# Patient Record
Sex: FEMALE | Race: WHITE | NOT HISPANIC OR LATINO | Employment: OTHER | URBAN - NONMETROPOLITAN AREA
[De-identification: names, ages, dates, MRNs, and addresses within clinical notes are randomized per-mention and may not be internally consistent; named-entity substitution may affect disease eponyms.]

---

## 2017-01-05 ENCOUNTER — OFFICE VISIT (OUTPATIENT)
Dept: MEDICAL GROUP | Facility: PHYSICIAN GROUP | Age: 77
End: 2017-01-05
Payer: MEDICARE

## 2017-01-05 VITALS
HEIGHT: 66 IN | TEMPERATURE: 97.9 F | HEART RATE: 93 BPM | WEIGHT: 135 LBS | RESPIRATION RATE: 16 BRPM | BODY MASS INDEX: 21.69 KG/M2 | SYSTOLIC BLOOD PRESSURE: 130 MMHG | DIASTOLIC BLOOD PRESSURE: 70 MMHG | OXYGEN SATURATION: 96 %

## 2017-01-05 DIAGNOSIS — M16.0 PRIMARY OSTEOARTHRITIS OF BOTH HIPS: ICD-10-CM

## 2017-01-05 DIAGNOSIS — M48.061 SPINAL STENOSIS OF LUMBAR REGION: ICD-10-CM

## 2017-01-05 PROCEDURE — 99212 OFFICE O/P EST SF 10 MIN: CPT | Performed by: INTERNAL MEDICINE

## 2017-01-05 RX ORDER — DICLOFENAC SODIUM 75 MG/1
75 TABLET, DELAYED RELEASE ORAL 2 TIMES DAILY
Qty: 60 TAB | Refills: 5 | Status: SHIPPED | OUTPATIENT
Start: 2017-01-05

## 2017-01-05 RX ORDER — CYCLOBENZAPRINE HCL 10 MG
TABLET ORAL
Qty: 30 TAB | Refills: 5 | Status: SHIPPED | OUTPATIENT
Start: 2017-01-05

## 2017-01-05 NOTE — ASSESSMENT & PLAN NOTE
Patient did see ortho, evaluation with xrays, she states her hip pain is much better, she has not had any surgery.  She did have the class for hip replacement but got spooked on the surgery, she seems much better after medrol dose rohan and now with the diclofenac.  She does want to be more flexible on the right hip, has had physical therapy, she is trying home therapy and stretching.

## 2017-01-05 NOTE — ASSESSMENT & PLAN NOTE
She continues to complain of minimal back pain, feels the diclofenac is very helpful. No radicular pain, no LE weakness.

## 2017-01-05 NOTE — MR AVS SNAPSHOT
"        Africa Warnershears   2017 10:20 AM   Office Visit   MRN: [id number]    Department:  [facility]   Dept Phone:  [phone]    Description:  Female : 1940   Provider:  Marli BAKER M.D.           Reason for Visit     Follow-Up hip pain       Allergies as of 2017     No Known Allergies      You were diagnosed with     Primary osteoarthritis of both hips   [073989]       Spinal stenosis of lumbar region   [677179]         Vital Signs     Blood Pressure Pulse Temperature Respirations Height Weight    130/70 mmHg 93 36.6 °C (97.9 °F) 16 1.689 m (5' 6.5\") 61.236 kg (135 lb)    Body Mass Index Oxygen Saturation Smoking Status             21.47 kg/m2 96% Never Smoker          Basic Information     Date Of Birth Sex Race Ethnicity Preferred Language    1940 Female White Non- English      Your appointments     2017  1:20 PM   ANNUAL EXAM PREVENTATIVE with Marli BAKER M.D.   [facility] (--)    [address] [facility] [id number] [phone]              Problem List              ICD-10-CM Priority Class Noted - Resolved    OSTEOPOROSIS    2012 - Present    Tinnitus    2012 - Present    History of mammogram Z92.89   2012 - Present    S/P colonoscopy Z98.890   2012 - Present    Sciatica M54.30   2012 - Present    Right knee pain M25.561   2014 - Present    LLQ abdominal pain R10.32   2016 - Present    Spinal stenosis of lumbar region M48.06   2016 - Present    Primary osteoarthritis of both hips M16.0   2016 - Present    Neuropathy (HCC) G62.9   2016 - Present      Health Maintenance        Date Due Completion Dates    IMM DTaP/Tdap/Td Vaccine (1 - Tdap) 1960 ---    PAP SMEAR 1962 ---    IMM PNEUMOCOCCAL 65+ (ADULT) LOW/MEDIUM RISK SERIES (1 of 2 - PCV13) 2006 ---    MAMMOGRAM 2013    BONE DENSITY 2016 (Done)    Override on 2011: Done  "    IMM INFLUENZA (1) 9/1/2016 11/2/2012    COLONOSCOPY 4/16/2021 4/16/2011 (Done)    Override on 4/16/2011: Done ([place])            Current Immunizations     Influenza TIV (IM) 11/2/2012  1:44 PM    SHINGLES VACCINE 9/2/2014      Below and/or attached are the medications your provider expects you to take. Review all of your home medications and newly ordered medications with your provider and/or pharmacist. Follow medication instructions as directed by your provider and/or pharmacist. Please keep your medication list with you and share with your provider. Update the information when medications are discontinued, doses are changed, or new medications (including over-the-counter products) are added; and carry medication information at all times in the event of emergency situations     Allergies:  No Known Allergies          Medications  Valid as of: January 05, 2017 - 11:39 AM    Generic Name Brand Name Tablet Size Instructions for use    Cyclobenzaprine HCl (Tab) FLEXERIL 10 MG 1 PO q HS prn back pain        Diclofenac Sodium (Tablet Delayed Response) VOLTAREN 75 MG Take 1 Tab by mouth 2 times a day.        Gabapentin (Cap) NEURONTIN 300 MG Take 300 mg by mouth 3 times a day.        MethylPREDNISolone (Tablet Therapy Pack) MEDROL DOSEPAK 4 MG As per Dose Carlos        Naproxen Sodium   Take  by mouth.        .                 Medicines prescribed today were sent to:     [facility] [address] [place] [id number]    Phone: [phone] Fax: [phone]    Open 24 Hours?: No      Medication refill instructions:       If your prescription bottle indicates you have medication refills left, it is not necessary to call your provider’s office. Please contact your pharmacy and they will refill your medication.    If your prescription bottle indicates you do not have any refills left, you may request refills at any time through one of the following ways: The  online [employer] system (except Urgent Care), by calling your provider’s office, or by asking your pharmacy to contact your provider’s office with a refill request. Medication refills are processed only during regular business hours and may not be available until the next business day. Your provider may request additional information or to have a follow-up visit with you prior to refilling your medication.   *Please Note: Medication refills are assigned a new Rx number when refilled electronically. Your pharmacy may indicate that no refills were authorized even though a new prescription for the same medication is available at the pharmacy. Please request the medicine by name with the pharmacy before contacting your provider for a refill.           [employer] Access Code: Activation code not generated  Current [employer] Status: Active

## 2017-01-10 NOTE — PROGRESS NOTES
Chief Complaint   Patient presents with   • Follow-Up     hip pain        HISTORY OF PRESENT ILLNESS: Patient is a 76 y.o. female established patient who presents today to discuss the medical issues below.    Primary osteoarthritis of both hips  Patient did see ortho, evaluation with xrays, she states her hip pain is much better, she has not had any surgery.  She did have the class for hip replacement but got spooked on the surgery, she feels much better after medrol dose rohan and now with the diclofenac.  She does want to be more flexible on the right hip, has had physical therapy, she is trying home therapy and stretching.        Spinal stenosis of lumbar region  She continues to complain of minimal back pain, feels the diclofenac is very helpful. No radicular pain, no LE weakness.        Patient Active Problem List    Diagnosis Date Noted   • Spinal stenosis of lumbar region 07/29/2016   • Primary osteoarthritis of both hips 07/29/2016   • Neuropathy (HCC) 07/29/2016   • LLQ abdominal pain 06/21/2016   • Right knee pain 04/16/2014   • OSTEOPOROSIS 11/02/2012   • Tinnitus 11/02/2012   • History of mammogram 11/02/2012   • S/P colonoscopy 11/02/2012   • Sciatica 11/02/2012       Allergies:Review of patient's allergies indicates no known allergies.    Current Outpatient Prescriptions   Medication Sig Dispense Refill   • cyclobenzaprine (FLEXERIL) 10 MG Tab 1 PO q HS prn back pain 30 Tab 5   • diclofenac EC (VOLTAREN) 75 MG Tablet Delayed Response Take 1 Tab by mouth 2 times a day. 60 Tab 5   • Naproxen Sodium (ALEVE PO) Take  by mouth.     • gabapentin (NEURONTIN) 300 MG Cap Take 300 mg by mouth 3 times a day.       No current facility-administered medications for this visit.         Past Medical History   Diagnosis Date   • OSTEOPOROSIS 11/2/2012   • Tinnitus 11/2/2012   • S/P colonoscopy 11/2/2012   • Spinal stenosis of lumbar region 7/29/2016   • Primary osteoarthritis of both hips 7/29/2016   • Neuropathy (HCC)  "2016       Social History   Substance Use Topics   • Smoking status: Never Smoker    • Smokeless tobacco: Never Used   • Alcohol Use: 0.6 oz/week     1 Glasses of wine per week      Comment: rare       No family status information on file.     Family History   Problem Relation Age of Onset   • Stroke Father       age 89   • Stroke Mother       age 87   • Cancer Sister      breast       ROS:    Respiratory: Negative for cough, sputum production, shortness of breath or wheezing.    Cardiovascular: Negative for chest pain, palpitations, orthopnea, dyspnea with exertion or edema.   Gastrointestinal: Negative for GI upset, nausea, vomiting, abdominal pain, constipation or diarrhea.   Genitourinary: Negative for dysuria, urgency, hesitancy or frequency.       Exam:    Blood pressure 130/70, pulse 93, temperature 36.6 °C (97.9 °F), resp. rate 16, height 1.689 m (5' 6.5\"), weight 61.236 kg (135 lb), SpO2 96 %.  General:  Well nourished, well developed female in NAD.  Pulmonary: Clear to ausculation and percussion.  Normal effort. No rales, rhonchi, or wheezing.  Cardiovascular: Regular rate and rhythm without murmur.   Abdomen: Normal bowel sounds soft and nontender no palpable liver spleen bladder mass.  Extremities: No LE edema noted. Neg straight leg testing, neg external hip rotation pain and no point tenderness to palpation  Neuro: Grossly nonfocal.  Psych: Alert and oriented to person, place, and time. Appropriate mood and conversation.    This dictation was created using voice recognition software. I have made reasonable attempts to correct errors, however, errors of grammar and content may exist.          Assessment/Plan:    1. Primary osteoarthritis of both hips  Reviewed risk Voltaren, conservative management for now.   - diclofenac EC (VOLTAREN) 75 MG Tablet Delayed Response; Take 1 Tab by mouth 2 times a day.  Dispense: 60 Tab; Refill: 5    2. Spinal stenosis of lumbar region  Conservative " management, doing relatively well for age, support, discussed stretching exercise, support conservatively   - cyclobenzaprine (FLEXERIL) 10 MG Tab; 1 PO q HS prn back pain  Dispense: 30 Tab; Refill: 5

## 2017-04-06 ENCOUNTER — OFFICE VISIT (OUTPATIENT)
Dept: MEDICAL GROUP | Facility: PHYSICIAN GROUP | Age: 77
End: 2017-04-06
Payer: MEDICARE

## 2017-04-06 ENCOUNTER — HOSPITAL ENCOUNTER (OUTPATIENT)
Dept: LAB | Facility: MEDICAL CENTER | Age: 77
End: 2017-04-06
Attending: FAMILY MEDICINE
Payer: MEDICARE

## 2017-04-06 VITALS
HEART RATE: 74 BPM | OXYGEN SATURATION: 92 % | BODY MASS INDEX: 21.69 KG/M2 | DIASTOLIC BLOOD PRESSURE: 70 MMHG | WEIGHT: 135 LBS | TEMPERATURE: 97.5 F | HEIGHT: 66 IN | SYSTOLIC BLOOD PRESSURE: 130 MMHG

## 2017-04-06 DIAGNOSIS — R20.2 PARESTHESIA OF BOTH FEET: ICD-10-CM

## 2017-04-06 DIAGNOSIS — M81.0 OSTEOPOROSIS: ICD-10-CM

## 2017-04-06 LAB
ALBUMIN SERPL BCP-MCNC: 4.4 G/DL (ref 3.2–4.9)
ALBUMIN/GLOB SERPL: 1.4 G/DL
ALP SERPL-CCNC: 65 U/L (ref 30–99)
ALT SERPL-CCNC: 18 U/L (ref 2–50)
ANION GAP SERPL CALC-SCNC: 5 MMOL/L (ref 0–11.9)
AST SERPL-CCNC: 26 U/L (ref 12–45)
BILIRUB SERPL-MCNC: 0.4 MG/DL (ref 0.1–1.5)
BUN SERPL-MCNC: 19 MG/DL (ref 8–22)
CALCIUM SERPL-MCNC: 10.1 MG/DL (ref 8.5–10.5)
CHLORIDE SERPL-SCNC: 103 MMOL/L (ref 96–112)
CO2 SERPL-SCNC: 31 MMOL/L (ref 20–33)
CREAT SERPL-MCNC: 0.8 MG/DL (ref 0.5–1.4)
ERYTHROCYTE [DISTWIDTH] IN BLOOD BY AUTOMATED COUNT: 42.3 FL (ref 35.9–50)
ERYTHROCYTE [SEDIMENTATION RATE] IN BLOOD BY WESTERGREN METHOD: 12 MM/HOUR (ref 0–30)
GFR SERPL CREATININE-BSD FRML MDRD: >60 ML/MIN/1.73 M 2
GLOBULIN SER CALC-MCNC: 3.2 G/DL (ref 1.9–3.5)
GLUCOSE SERPL-MCNC: 82 MG/DL (ref 65–99)
HCT VFR BLD AUTO: 45.2 % (ref 37–47)
HGB BLD-MCNC: 14.7 G/DL (ref 12–16)
MCH RBC QN AUTO: 29.2 PG (ref 27–33)
MCHC RBC AUTO-ENTMCNC: 32.5 G/DL (ref 33.6–35)
MCV RBC AUTO: 89.7 FL (ref 81.4–97.8)
PLATELET # BLD AUTO: 261 K/UL (ref 164–446)
PMV BLD AUTO: 11.6 FL (ref 9–12.9)
POTASSIUM SERPL-SCNC: 4.4 MMOL/L (ref 3.6–5.5)
PROT SERPL-MCNC: 7.6 G/DL (ref 6–8.2)
RBC # BLD AUTO: 5.04 M/UL (ref 4.2–5.4)
SODIUM SERPL-SCNC: 139 MMOL/L (ref 135–145)
TSH SERPL DL<=0.005 MIU/L-ACNC: 1.96 UIU/ML (ref 0.3–3.7)
VIT B12 SERPL-MCNC: 731 PG/ML (ref 211–911)
WBC # BLD AUTO: 6.9 K/UL (ref 4.8–10.8)

## 2017-04-06 PROCEDURE — 36415 COLL VENOUS BLD VENIPUNCTURE: CPT

## 2017-04-06 PROCEDURE — 4040F PNEUMOC VAC/ADMIN/RCVD: CPT | Mod: 8P | Performed by: FAMILY MEDICINE

## 2017-04-06 PROCEDURE — 85652 RBC SED RATE AUTOMATED: CPT

## 2017-04-06 PROCEDURE — G8420 CALC BMI NORM PARAMETERS: HCPCS | Performed by: FAMILY MEDICINE

## 2017-04-06 PROCEDURE — 1036F TOBACCO NON-USER: CPT | Performed by: FAMILY MEDICINE

## 2017-04-06 PROCEDURE — 85027 COMPLETE CBC AUTOMATED: CPT

## 2017-04-06 PROCEDURE — 99214 OFFICE O/P EST MOD 30 MIN: CPT | Performed by: FAMILY MEDICINE

## 2017-04-06 PROCEDURE — G8432 DEP SCR NOT DOC, RNG: HCPCS | Performed by: FAMILY MEDICINE

## 2017-04-06 PROCEDURE — 1101F PT FALLS ASSESS-DOCD LE1/YR: CPT | Performed by: FAMILY MEDICINE

## 2017-04-06 PROCEDURE — 3017F COLORECTAL CA SCREEN DOC REV: CPT | Performed by: FAMILY MEDICINE

## 2017-04-06 PROCEDURE — 82607 VITAMIN B-12: CPT

## 2017-04-06 PROCEDURE — 84443 ASSAY THYROID STIM HORMONE: CPT

## 2017-04-06 PROCEDURE — 80053 COMPREHEN METABOLIC PANEL: CPT

## 2017-04-06 ASSESSMENT — PAIN SCALES - GENERAL: PAINLEVEL: 4=SLIGHT-MODERATE PAIN

## 2017-04-06 ASSESSMENT — PATIENT HEALTH QUESTIONNAIRE - PHQ9: CLINICAL INTERPRETATION OF PHQ2 SCORE: 1

## 2017-04-06 NOTE — PROGRESS NOTES
Subjective:   Africa Mathews is a 76 y.o. female here today complaining of pain in both feet.  Osteoporosis  Patient has history of osteoporosis and vertebral compression fractures. Apparently she was taking Fosamax along time back but she stopped based on some advertisements that apparently said that Fosamax makes her bones more porous and needs to fractures. She is currently taking calcium and vitamin D 3000 units daily. She says that she feels good and her bones are good and she does not feel the need to get a DEXA scan or get any treatment for osteoporosis at this time.    Paresthesia of both feet  Patient is here with complaint of pain in both feet. This is going on for several years and recently has been worse. She describes the pain as a sensation of walking on hot stone or hot iron. It is a constant pain and does not have any aggravating or relieving factors. She also has pain in her knees and hips and back which is not that bad and it has been investigated in the past and it appears that she has some arthritis in both her hips and knee and also has spinal stenosis associated with some bulging discs (MRI 2014) she denies any shooting pain down her legs. At some point she was taking Lyrica which seemed to help at that time. Recently she was taking gabapentin but is not taking it anymore. And patient states that the, patient and did not help her much. She is not taking any medications currently.           Current medicines (including changes today)  Current Outpatient Prescriptions   Medication Sig Dispense Refill   • cyclobenzaprine (FLEXERIL) 10 MG Tab 1 PO q HS prn back pain 30 Tab 5   • diclofenac EC (VOLTAREN) 75 MG Tablet Delayed Response Take 1 Tab by mouth 2 times a day. 60 Tab 5   • Naproxen Sodium (ALEVE PO) Take  by mouth.     • gabapentin (NEURONTIN) 300 MG Cap Take 300 mg by mouth 3 times a day.       No current facility-administered medications for this visit.     She  has a past medical  "history of OSTEOPOROSIS (11/2/2012); Tinnitus (11/2/2012); S/P colonoscopy (11/2/2012); Spinal stenosis of lumbar region (7/29/2016); Primary osteoarthritis of both hips (7/29/2016); and Neuropathy (CMS-HCC) (7/29/2016). She also has no past medical history of Breast cancer (CMS-HCC).    ROS   No chest pain, no shortness of breath, no abdominal pain       Objective:     Blood pressure 130/70, pulse 74, temperature 36.4 °C (97.5 °F), height 1.676 m (5' 5.98\"), weight 61.236 kg (135 lb), SpO2 92 %, not currently breastfeeding. Body mass index is 21.8 kg/(m^2).     PHYSICAL EXAM     GEN: Alert and oriented,well appearing, no acute distress  SKIN: warm, dry to touch, no rashes or lesions in visible areas  PSYCH: mood and affect normal, judgement normal  EYES: Conjunctiva clear, lids normal,pupils equal round and reactive  ENMT: Normal external nose and ears,EACs normal appearing, TM pearly gray with normal light reflex bilaterally,nasal mucosa and turbinates normal appearing without erythema or edema, lips without lesions,good dentition,oropharynx clear  Neck : Trachea midline, no masses or swelling, no thyromegaly  LYMPHATIC : No cervical or supraclavicular lymphadenopathy  RESPIRATORY : Unlabored respiratory effort, no distress noted, clear to auscultation bilaterally, no wheeze, rhonchi, crackles  CARDIOVASCULAR: RRR, S1 S2 normal, no murmurs , gallop , no carotid bruit, no edema of the extremities, pedal pulses B/L 1+  GI: Soft, Non tender, No rebound or guarding, no hepatosplenomegaly, no masses  EXTREMITIES : Pedal pulses B/L 1+, prominent superficial veins on both legs, no wounds or ulcers, sensation intact    Assessment and Plan:   The following treatment plan was discussed    1. Paresthesia of both feet  New problem.Ordered labs as below.Will also perform Ultrasound to screen for PAD and  NCV for peripheral neuropathy.  - REFERRAL TO NEURODIAGNOSTICS (EEG,EP,EMG/NCS/DBS) Modality Requested: EMG/NCS-Comment " Extremities  - US-CATRACHO MULTI LEVEL BILAT; Future  - VITAMIN B12; Future  - TSH; Future  - WESTERGREN SED RATE; Future  - CBC WITHOUT DIFFERENTIAL; Future  - COMP METABOLIC PANEL; Future    2. Osteoporosis  Offered patient to repeat DEXA scan and discuss available treatment options for osteoporosis but patient declined stating that she thinks her bones are really good and she does not feel the need of test and treatment.    Followup: Return in about 3 months (around 7/6/2017) for AWV.         Please note that this dictation was created using voice recognition software. I have made every reasonable attempt to correct obvious errors, but I expect that there are errors of grammar and possibly content that I did not discover before finalizing the note.

## 2017-04-06 NOTE — MR AVS SNAPSHOT
"        Africa Yoons   2017 9:00 AM   Office Visit   MRN: [id number]    Department:  [facility]   Dept Phone:  [phone]    Description:  Female : 1940   Provider:  Nu Turpin M.D.           Reason for Visit     Establish Care           Allergies as of 2017     No Known Allergies      You were diagnosed with     Paresthesia of both feet   [7143484]       Osteoporosis   [9430255]         Vital Signs     Blood Pressure Pulse Temperature Height Weight Body Mass Index    130/70 mmHg 74 36.4 °C (97.5 °F) 1.676 m (5' 5.98\") 61.236 kg (135 lb) 21.80 kg/m2    Oxygen Saturation Breastfeeding? Smoking Status             92% No Never Smoker          Basic Information     Date Of Birth Sex Race Ethnicity Preferred Language    1940 Female White Non- English      Problem List              ICD-10-CM Priority Class Noted - Resolved    Osteoporosis M81.0   2012 - Present    Tinnitus    2012 - Present    History of mammogram Z92.89   2012 - Present    Sciatica M54.30   2012 - Present    Right knee pain M25.561   2014 - Present    Spinal stenosis of lumbar region M48.06   2016 - Present    Primary osteoarthritis of both hips M16.0   2016 - Present    Neuropathy (CMS-HCC) G62.9   2016 - Present    Paresthesia of both feet R20.2   2017 - Present      Health Maintenance        Date Due Completion Dates    IMM DTaP/Tdap/Td Vaccine (1 - Tdap) 1960 ---    PAP SMEAR 1962 ---    IMM PNEUMOCOCCAL 65+ (ADULT) LOW/MEDIUM RISK SERIES (1 of 2 - PCV13) 2006 ---    MAMMOGRAM 2013    BONE DENSITY 2016 (Done)    Override on 2011: Done    COLONOSCOPY 2021 (Done)    Override on 2011: Done ([place])            Current Immunizations     Influenza TIV (IM) 2012  1:44 PM    SHINGLES VACCINE 2014      Below and/or attached are the medications your provider expects you to take. Review " all of your home medications and newly ordered medications with your provider and/or pharmacist. Follow medication instructions as directed by your provider and/or pharmacist. Please keep your medication list with you and share with your provider. Update the information when medications are discontinued, doses are changed, or new medications (including over-the-counter products) are added; and carry medication information at all times in the event of emergency situations     Allergies:  No Known Allergies          Medications  Valid as of: April 06, 2017 - 10:23 AM    Generic Name Brand Name Tablet Size Instructions for use    Cyclobenzaprine HCl (Tab) FLEXERIL 10 MG 1 PO q HS prn back pain        Diclofenac Sodium (Tablet Delayed Response) VOLTAREN 75 MG Take 1 Tab by mouth 2 times a day.        Gabapentin (Cap) NEURONTIN 300 MG Take 300 mg by mouth 3 times a day.        Naproxen Sodium   Take  by mouth.        .                 Medicines prescribed today were sent to:     [facility] [address] [place] [address] [address] [facility]    [address] [facility] [address]    Phone: [phone] Fax: [phone]    Open 24 Hours?: No      Medication refill instructions:       If your prescription bottle indicates you have medication refills left, it is not necessary to call your provider’s office. Please contact your pharmacy and they will refill your medication.    If your prescription bottle indicates you do not have any refills left, you may request refills at any time through one of the following ways: The online [employer] system (except Urgent Care), by calling your provider’s office, or by asking your pharmacy to contact your provider’s office with a refill request. Medication refills are processed only during regular business hours and may not be available until the next business day. Your provider may request additional information or to have a follow-up visit with you prior to refilling your  medication.   *Please Note: Medication refills are assigned a new Rx number when refilled electronically. Your pharmacy may indicate that no refills were authorized even though a new prescription for the same medication is available at the pharmacy. Please request the medicine by name with the pharmacy before contacting your provider for a refill.        Your To Do List     Future Labs/Procedures Complete By Expires    CBC WITHOUT DIFFERENTIAL  As directed 10/7/2017    COMP METABOLIC PANEL  As directed 4/7/2018    TSH  As directed 4/7/2018    US-CATRACHO MULTI LEVEL BILAT  As directed 4/6/2018    VITAMIN B12  As directed 4/7/2018    WESTERGREN SED RATE  As directed 4/7/2018      Referral     A referral request has been sent to our patient care coordination department. Please allow 3-5 business days for us to process this request and contact you either by phone or mail. If you do not hear from us by the 5th business day, please call us at [phone].           [employer] Access Code: Activation code not generated  Current [employer] Status: Active

## 2017-04-06 NOTE — ASSESSMENT & PLAN NOTE
Patient has history of osteoporosis and vertebral compression fractures. Apparently she was taking Fosamax along time back but she stopped based on some advertisements that apparently said that Fosamax makes her bones more porous and needs to fractures. She is currently taking calcium and vitamin D 3000 units daily. She says that she feels good and her bones are good and she does not feel the need to get a DEXA scan or get any treatment for osteoporosis at this time.

## 2017-04-06 NOTE — ASSESSMENT & PLAN NOTE
Patient is here with complaint of pain in both feet. This is going on for several years and recently has been worse. She describes the pain as a sensation of walking on hot stone or hot iron. It is a constant pain and does not have any aggravating or relieving factors. She also has pain in her knees and hips and back which is not that bad and it has been investigated in the past and it appears that she has some arthritis in both her hips and knee and also has spinal stenosis associated with some bulging discs (MRI 2014) she denies any shooting pain down her legs. At some point she was taking Lyrica which seemed to help at that time. Recently she was taking gabapentin but is not taking it anymore. And patient states that the, patient and did not help her much. She is not taking any medications currently.

## 2017-04-07 ENCOUNTER — TELEPHONE (OUTPATIENT)
Dept: MEDICAL GROUP | Facility: PHYSICIAN GROUP | Age: 77
End: 2017-04-07

## 2017-04-07 NOTE — TELEPHONE ENCOUNTER
----- Message from Nu Turpin M.D. sent at 4/6/2017  9:19 PM PDT -----  Please inform patient that all her blood tests including Viamin B12 and thyroid levels are normal.

## 2017-04-13 ENCOUNTER — HOSPITAL ENCOUNTER (OUTPATIENT)
Dept: RADIOLOGY | Facility: MEDICAL CENTER | Age: 77
End: 2017-04-13
Attending: FAMILY MEDICINE
Payer: MEDICARE

## 2017-04-13 DIAGNOSIS — R20.2 PARESTHESIA OF BOTH FEET: ICD-10-CM

## 2017-04-13 PROCEDURE — 93922 UPR/L XTREMITY ART 2 LEVELS: CPT

## 2017-04-13 PROCEDURE — 93922 UPR/L XTREMITY ART 2 LEVELS: CPT | Mod: 26 | Performed by: SURGERY

## 2017-05-16 ENCOUNTER — APPOINTMENT (OUTPATIENT)
Dept: NEUROLOGY | Facility: MEDICAL CENTER | Age: 77
End: 2017-05-16
Payer: MEDICARE